# Patient Record
Sex: MALE | Race: WHITE | NOT HISPANIC OR LATINO | Employment: FULL TIME | URBAN - METROPOLITAN AREA
[De-identification: names, ages, dates, MRNs, and addresses within clinical notes are randomized per-mention and may not be internally consistent; named-entity substitution may affect disease eponyms.]

---

## 2022-03-08 ENCOUNTER — TREATMENT (OUTPATIENT)
Dept: PHYSICAL THERAPY | Facility: CLINIC | Age: 57
End: 2022-03-08

## 2022-03-08 DIAGNOSIS — S76.011D HIP STRAIN, RIGHT, SUBSEQUENT ENCOUNTER: Primary | ICD-10-CM

## 2022-03-08 DIAGNOSIS — S86.112D STRAIN OF GASTROCNEMIUS MUSCLE OF LEFT LOWER EXTREMITY, SUBSEQUENT ENCOUNTER: ICD-10-CM

## 2022-03-08 DIAGNOSIS — M25.559 PAIN, HIP: ICD-10-CM

## 2022-03-08 PROCEDURE — 97162 PT EVAL MOD COMPLEX 30 MIN: CPT | Performed by: PHYSICAL THERAPIST

## 2022-03-08 PROCEDURE — 97530 THERAPEUTIC ACTIVITIES: CPT | Performed by: PHYSICAL THERAPIST

## 2022-03-08 PROCEDURE — 97110 THERAPEUTIC EXERCISES: CPT | Performed by: PHYSICAL THERAPIST

## 2022-03-08 NOTE — PROGRESS NOTES
Physical Therapy Initial Evaluation and Plan of Care    Patient: Baron Ribeiro   : 1965  Diagnosis/ICD-10 Code:  Hip strain, right, subsequent encounter [S76.011D]  Referring practitioner: Federico Keys*  PMx Reviewed : 3/8/2022    Subjective Evaluation    History of Present Illness  Mechanism of injury: Pt presents to PT with (R) hip pain.  The pain is noticed with loading & torquing, rolling onto the (R) hip and initial weight bearing.    Initial injury happened after playing pickle ball for 2 hrs on concrete about 1 year ago.  The next day the pain started.        Saw Dr. Federico Vallejo, the orthopedic surgeon in [place].     Tore (L) Achilles 3 yrs ago - recovered w/o surgery but put in a boot and PT.  Feel weak in the (L) calf with too much exercise or tennis.        Occupation:   - [employer]  Activities:  Tennis, Weight Lifting, running , pickle ball  PLOF: Independent  Medical Hx Reviewed.      Quality of life: excellent    Pain  Current pain ratin  At best pain ratin  At worst pain ratin  Location: (R) Hip Region (over the greater troch)  Quality: dull ache, sharp and tight  Relieving factors: medications, rest and change in position  Aggravating factors: sleeping and stairs (initial WB after sitting > 1 hr, loading & torquing hip)  Progression: improved    Social Support  Lives in: multiple-level home  Lives with: spouse, young children and adult children    Hand dominance: right             Objective          Active Range of Motion   Left Hip   Flexion: 44 degrees   External rotation (90/90): 52 degrees   Internal rotation (90/90): 39 degrees     Right Hip   Flexion: 28 degrees   External rotation (90/90): 44 degrees   Internal rotation (90/90): 18 degrees with pain    Strength/Myotome Testing     Left Hip   Planes of Motion   Flexion: 5  Extension: 5  Abduction: 5  External rotation: 5  Internal rotation: 5    Right Hip   Planes of Motion   Flexion:  4  Extension: 4  Abduction: 4-  External rotation: 4-  Internal rotation: 4    Left Knee   Flexion: 5  Extension: 5    Right Knee   Flexion: 4+  Extension: 5    Left Ankle/Foot   Dorsiflexion: 5  Plantar flexion: 4+  Eversion: 5    Right Ankle/Foot   Dorsiflexion: 5  Plantar flexion: 5  Eversion: 5          Assessment & Plan     Assessment  Impairments: abnormal gait, abnormal or restricted ROM, activity intolerance, impaired balance, impaired physical strength, lacks appropriate home exercise program and pain with function    Assessment details: Pt presents to PT with symptoms consistent with a (R) hip strain likely caused by his (L) Gastroc weakness related to previous achilles tear (non surgical repair) and his return to playing tennis & pickle ball.  Pt would benefit from skilled PT intervention to address the deficits noted.   Prognosis: good    Goals  Plan Goals:  SHORT TERM GOALS: 5 weeks  1.  Patient to be compliant with HEP and demo good efficiency with TE  2.  Report < 2 sleep disturbances 2° hip pain.     3.  Increased Lumbar and SIJ mobility to allow for improved pelvic alignment and gait mechanics (equal step length and level pelvis throughout gait).    4.  Increased hip ER/IR ROM to WFL (IR to 30°) degrees to allow for increased ease with bed mobility and squatting.  5. Pt will report minimal-no tenderness to palpation with firm pressure to the (R) hip.   6. Pt. Able to ambulate after sitting > 60 mins with min with pain < 5/10 with acceptable pattern.      LONG TERM GOALS:  10 weeks  1.  Pt. to score > 90% perceived ability on LEFS  2.  Pain level < 2/10 in hips with all activities including sitting > 2 hr continuously.   3.  Hip  AROM to WNL to allow for return to ADL's/IADLS and functional activities without increased symptoms  4. Hip strength to 5/5  to allow for pushing, pulling and more strenuous activities to occur without pain.  Walk/run > 60 min. w/  no pain  5. No palpable tenderness to the  hip.         Plan  Therapy options: will be seen for skilled therapy services  Planned modality interventions: cryotherapy, electrical stimulation/Russian stimulation, iontophoresis, TENS, thermotherapy (hydrocollator packs), traction and ultrasound  Other planned modality interventions: Dry Needling  Planned therapy interventions: abdominal trunk stabilization, ADL retraining, body mechanics training, balance/weight-bearing training, flexibility, functional ROM exercises, gait training, home exercise program, joint mobilization, manual therapy, neuromuscular re-education, postural training, soft tissue mobilization, spinal/joint mobilization, strengthening, stretching and therapeutic activities  Frequency: 2x week  Duration in visits: 16  Treatment plan discussed with: patient        Manual Therapy:          mins  75652;  Therapeutic Exercise:     15     mins  37849;     Neuromuscular Cory:         mins  84538;    Therapeutic Activity:      10     mins  14842;     Gait Training:            mins  04614;     Ultrasound:           mins  78421;    Electrical Stimulation:          mins  96565 ( );  Dry Needling           mins self-pay  Traction           mins 98595  Canalith Repositioning         mins 37890      Timed Treatment:   25   mins   Total Treatment:     65   mins    PT SIGNATURE: Da Brewer PT   [facility] #[id number]    DATE TREATMENT INITIATED: 3/8/2022       Initial Certification  Certification Period: 6/6/2022  I certify that the therapy services are furnished while this patient is under my care.  The services outlined above are required by this patient, and will be reviewed every 90 days.     PHYSICIAN: Federico Vallejo MD      DATE:     Please sign and return via fax to [phone]. Thank you, [facility].

## 2022-03-14 ENCOUNTER — TREATMENT (OUTPATIENT)
Dept: PHYSICAL THERAPY | Facility: CLINIC | Age: 57
End: 2022-03-14

## 2022-03-14 DIAGNOSIS — S86.112D STRAIN OF GASTROCNEMIUS MUSCLE OF LEFT LOWER EXTREMITY, SUBSEQUENT ENCOUNTER: ICD-10-CM

## 2022-03-14 DIAGNOSIS — S76.011D HIP STRAIN, RIGHT, SUBSEQUENT ENCOUNTER: Primary | ICD-10-CM

## 2022-03-14 DIAGNOSIS — M25.559 PAIN, HIP: ICD-10-CM

## 2022-03-14 PROCEDURE — 97110 THERAPEUTIC EXERCISES: CPT | Performed by: PHYSICAL THERAPIST

## 2022-03-14 PROCEDURE — 97140 MANUAL THERAPY 1/> REGIONS: CPT | Performed by: PHYSICAL THERAPIST

## 2022-03-14 NOTE — PROGRESS NOTES
Physical Therapy Daily Progress Note  Visit: 2    Baron Ribeiro reports: I am doing ok.  I have not been doing my exercises because I traveled to [place] with my kids for work.      (Pt was late 7 mins)    Subjective     Objective   See Exercise, Manual, and Modality Logs for complete treatment.       Assessment & Plan     Assessment    Assessment details: Reviewed with the pt that he needs to do the HEP to see the benefit from PT.  The pt's (R) hip is very tight into IR.  The pt had a loud cavitation with the pubic clearing MET.  Minimal HEP progression because of noncompliance with exercises.  Pt needed review of all exercises for correct mechanics.      Plan  Plan details: Progress ROM / strengthening / stabilization / functional activity as tolerated          Manual Therapy:     10     mins  75482;  Therapeutic Exercise:      30    mins  44413;     Neuromuscular Cory:         mins  27489;    Therapeutic Activity:      5     mins  53520;     Gait Training:            mins  20767;     Ultrasound:           mins  12458;    Electrical Stimulation:          mins  53805 ( );  Dry Needling           mins self-pay  Traction           mins 58535  Canalith Repositioning         mins 85668      Timed Treatment:   45   mins   Total Treatment:     45   mins    Da Brewer PT  [place] License #: [id number]    Physical Therapist

## 2022-03-18 ENCOUNTER — TREATMENT (OUTPATIENT)
Dept: PHYSICAL THERAPY | Facility: CLINIC | Age: 57
End: 2022-03-18

## 2022-03-18 DIAGNOSIS — M25.559 PAIN, HIP: ICD-10-CM

## 2022-03-18 DIAGNOSIS — S76.011D HIP STRAIN, RIGHT, SUBSEQUENT ENCOUNTER: Primary | ICD-10-CM

## 2022-03-18 DIAGNOSIS — S86.112D STRAIN OF GASTROCNEMIUS MUSCLE OF LEFT LOWER EXTREMITY, SUBSEQUENT ENCOUNTER: ICD-10-CM

## 2022-03-18 PROCEDURE — 97110 THERAPEUTIC EXERCISES: CPT | Performed by: PHYSICAL THERAPIST

## 2022-03-18 PROCEDURE — 97140 MANUAL THERAPY 1/> REGIONS: CPT | Performed by: PHYSICAL THERAPIST

## 2022-03-18 NOTE — PROGRESS NOTES
Physical Therapy Daily Progress Note  Visit: 3    Baron Ribeiro reports: I was really sore in my (R) hip 2 days after my last visit.  I have tried to do my HEP more, but I have not done them today.      (Pt was 10 mins late for visit.)    Subjective     Objective   See Exercise, Manual, and Modality Logs for complete treatment.       Assessment & Plan     Assessment    Assessment details: The pt needed continued review of the HEP to perform correctly.  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  The pt's tardiness limited his visit progression.      Plan  Plan details: Look to start hip IR & ER exercises next visit.          Manual Therapy:     10     mins  26171;  Therapeutic Exercise:     33     mins  27341;     Neuromuscular Cory:         mins  38905;    Therapeutic Activity:      5     mins  55624;     Gait Training:            mins  36744;     Ultrasound:           mins  60926;    Electrical Stimulation:          mins  91860 ( );  Dry Needling           mins self-pay  Traction           mins 26018  Canalith Repositioning         mins 19909      Timed Treatment:   48   mins   Total Treatment:     48   mins    Da Brewer PT  [place] License #: [id number]    Physical Therapist

## 2022-03-21 ENCOUNTER — TREATMENT (OUTPATIENT)
Dept: PHYSICAL THERAPY | Facility: CLINIC | Age: 57
End: 2022-03-21

## 2022-03-21 DIAGNOSIS — S86.112D STRAIN OF GASTROCNEMIUS MUSCLE OF LEFT LOWER EXTREMITY, SUBSEQUENT ENCOUNTER: ICD-10-CM

## 2022-03-21 DIAGNOSIS — S76.011D HIP STRAIN, RIGHT, SUBSEQUENT ENCOUNTER: Primary | ICD-10-CM

## 2022-03-21 DIAGNOSIS — M25.559 PAIN, HIP: ICD-10-CM

## 2022-03-21 PROCEDURE — 97110 THERAPEUTIC EXERCISES: CPT | Performed by: PHYSICAL THERAPIST

## 2022-03-21 PROCEDURE — 97140 MANUAL THERAPY 1/> REGIONS: CPT | Performed by: PHYSICAL THERAPIST

## 2022-03-21 NOTE — PROGRESS NOTES
"Physical Therapy Daily Progress Note  Visit: 4    Baron Ribeiro reports: I did ok after my last visit.  I went to the gym after my last visit too and did a lot of lifting with my legs to work my hips.  I was fine the next day but I played tennis for 2 hours and I was sore while I played and very sore the next morning.   \"It feels like my  (R) hip is loose and banging around in there.\"    Subjective     Objective   See Exercise, Manual, and Modality Logs for complete treatment.       Assessment & Plan     Assessment    Assessment details: The pt had difficulty with (R) hip IR & ER rotation exercise, but did tolerate it.  Reviewed with the pt that he needs to be compliant with his HEP more and less intense wt lifting to help his recovery.      Plan  Plan details: Progress ROM / strengthening / stabilization / functional activity as tolerated    The pt won't return until next week because of travel for work.          Manual Therapy:     10     mins  83599;  Therapeutic Exercise:     38     mins  05729;     Neuromuscular Cory:         mins  09645;    Therapeutic Activity:      5     mins  37700;     Gait Training:            mins  68001;     Ultrasound:           mins  63639;    Electrical Stimulation:          mins  65623 ( );  Dry Needling           mins self-pay  Traction           mins 55091  Canalith Repositioning         mins 26153      Timed Treatment:   53   mins   Total Treatment:     53   mins    Da Brewer PT  [place] License #: [id number]    Physical Therapist      "

## 2022-03-28 ENCOUNTER — TREATMENT (OUTPATIENT)
Dept: PHYSICAL THERAPY | Facility: CLINIC | Age: 57
End: 2022-03-28

## 2022-03-28 DIAGNOSIS — S76.011D HIP STRAIN, RIGHT, SUBSEQUENT ENCOUNTER: Primary | ICD-10-CM

## 2022-03-28 DIAGNOSIS — M25.559 PAIN, HIP: ICD-10-CM

## 2022-03-28 DIAGNOSIS — S86.112D STRAIN OF GASTROCNEMIUS MUSCLE OF LEFT LOWER EXTREMITY, SUBSEQUENT ENCOUNTER: ICD-10-CM

## 2022-03-28 PROCEDURE — 97110 THERAPEUTIC EXERCISES: CPT | Performed by: PHYSICAL THERAPIST

## 2022-03-28 PROCEDURE — 97530 THERAPEUTIC ACTIVITIES: CPT | Performed by: PHYSICAL THERAPIST

## 2022-04-01 ENCOUNTER — TREATMENT (OUTPATIENT)
Dept: PHYSICAL THERAPY | Facility: CLINIC | Age: 57
End: 2022-04-01

## 2022-04-01 DIAGNOSIS — S86.112D STRAIN OF GASTROCNEMIUS MUSCLE OF LEFT LOWER EXTREMITY, SUBSEQUENT ENCOUNTER: ICD-10-CM

## 2022-04-01 DIAGNOSIS — S76.011D HIP STRAIN, RIGHT, SUBSEQUENT ENCOUNTER: Primary | ICD-10-CM

## 2022-04-01 DIAGNOSIS — M25.559 PAIN, HIP: ICD-10-CM

## 2022-04-01 PROCEDURE — 97110 THERAPEUTIC EXERCISES: CPT | Performed by: PHYSICAL THERAPIST

## 2022-04-01 PROCEDURE — 97530 THERAPEUTIC ACTIVITIES: CPT | Performed by: PHYSICAL THERAPIST

## 2022-04-01 NOTE — PROGRESS NOTES
Physical Therapy Daily Progress Note  Visit: 6    Baron Ribeiro reports: I am doing ok, still feel it in my (R) hip but I am feeling stronger.      Subjective     Objective   See Exercise, Manual, and Modality Logs for complete treatment.       Assessment & Plan     Assessment    Assessment details: Substantial progression of exercises today with good tolerance in the clinic.  Reviewed what to do if has symptoms of soreness.  Was able to do more because the pt was on time to visit.      Plan  Plan details: Progress ROM / strengthening / stabilization / functional activity as tolerated          Manual Therapy:          mins  05479;  Therapeutic Exercise:     50     mins  48841;     Neuromuscular Cory:     5    mins  12161;    Therapeutic Activity:      10     mins  00626;     Gait Training:            mins  87439;     Ultrasound:           mins  99228;    Electrical Stimulation:          mins  49200 ( );  Dry Needling           mins self-pay  Traction           mins 57559  Canalith Repositioning         mins 06219      Timed Treatment:   65   mins   Total Treatment:     65   mins    Da Brewer PT  [place] License #: [id number]    Physical Therapist

## 2022-04-07 ENCOUNTER — TREATMENT (OUTPATIENT)
Dept: PHYSICAL THERAPY | Facility: CLINIC | Age: 57
End: 2022-04-07

## 2022-04-07 DIAGNOSIS — M25.559 PAIN, HIP: ICD-10-CM

## 2022-04-07 DIAGNOSIS — S76.011D HIP STRAIN, RIGHT, SUBSEQUENT ENCOUNTER: Primary | ICD-10-CM

## 2022-04-07 DIAGNOSIS — S86.112D STRAIN OF GASTROCNEMIUS MUSCLE OF LEFT LOWER EXTREMITY, SUBSEQUENT ENCOUNTER: ICD-10-CM

## 2022-04-07 PROCEDURE — 97530 THERAPEUTIC ACTIVITIES: CPT | Performed by: PHYSICAL THERAPIST

## 2022-04-07 PROCEDURE — 97110 THERAPEUTIC EXERCISES: CPT | Performed by: PHYSICAL THERAPIST

## 2022-04-07 NOTE — PROGRESS NOTES
Physical Therapy Daily Progress Note  Visit: 7    Baron Ribeiro reports: My (R) hip is feeling a little better.  It is not as sore when I get out of the car or bed.  Usually it is painful after sitting or laying down for awhile.    Subjective     Objective   See Exercise, Manual, and Modality Logs for complete treatment.       Assessment & Plan     Assessment    Assessment details: The pt is demonstrating improvement with increased strength and less pain in the (R) hip.  Continuing to work on strength and endurance of the (R) hip with rotators.  He returns to tennis this weekend so it will be a good test at a high level.      Plan  Plan details: Progress ROM / strengthening / stabilization / functional activity as tolerated          Manual Therapy:          mins  76271;  Therapeutic Exercise:     50     mins  40440;     Neuromuscular Cory:     5    mins  77331;    Therapeutic Activity:      10     mins  60121;     Gait Training:            mins  98458;     Ultrasound:           mins  62388;    Electrical Stimulation:          mins  46838 ( );  Dry Needling           mins self-pay  Traction           mins 99786  Canalith Repositioning         mins 80850      Timed Treatment:   65   mins   Total Treatment:     65   mins    Da Brewer PT  [place] License #: [id number]    Physical Therapist

## 2022-04-18 ENCOUNTER — TREATMENT (OUTPATIENT)
Dept: PHYSICAL THERAPY | Facility: CLINIC | Age: 57
End: 2022-04-18

## 2022-04-18 DIAGNOSIS — M25.559 PAIN, HIP: ICD-10-CM

## 2022-04-18 DIAGNOSIS — S76.011D HIP STRAIN, RIGHT, SUBSEQUENT ENCOUNTER: Primary | ICD-10-CM

## 2022-04-18 DIAGNOSIS — S86.112D STRAIN OF GASTROCNEMIUS MUSCLE OF LEFT LOWER EXTREMITY, SUBSEQUENT ENCOUNTER: ICD-10-CM

## 2022-04-18 PROCEDURE — 97110 THERAPEUTIC EXERCISES: CPT | Performed by: PHYSICAL THERAPIST

## 2022-04-18 NOTE — PROGRESS NOTES
Physical Therapy Daily Treatment Note/30 day re-assess      Patient: Baron Ribeiro   : 1965  Referring practitioner: Federico Keys*  Date of Initial Visit: Type: THERAPY  Noted: 3/8/2022  Today's Date: 2022  Patient seen for 8 sessions       Visit Diagnoses:    ICD-10-CM ICD-9-CM   1. Hip strain, right, subsequent encounter  S76.011D V58.89     843.9   2. Pain, hip  M25.559 719.45   3. Strain of gastrocnemius muscle of left lower extremity, subsequent encounter  S86.112D V58.89     844.8       Subjective Evaluation    History of Present Illness    Subjective comment: getting better. admits not walking enough but is biking more and doing his exercises more       Objective   See Exercise, Manual, and Modality Logs for complete treatment.       Assessment & Plan     Assessment    Assessment details: Goals  Plan Goals:  SHORT TERM GOALS: 5 weeks  1.  Patient to be compliant with HEP and demo good efficiency with TE. Partially met. Not doing HEP as much as he could.   2.  Report < 2 sleep disturbances 2° hip pain.   MET  3.  Increased Lumbar and SIJ mobility to allow for improved pelvic alignment and gait mechanics (equal step length and level pelvis throughout gait).  Ongoing. Still lacking end range mobility  4.  Increased hip ER/IR ROM to WFL (IR to 30°) degrees to allow for increased ease with bed mobility and squatting. Lacking normal ER on the R  5. Pt will report minimal-no tenderness to palpation with firm pressure to the (R) hip. MET  6. Pt. Able to ambulate after sitting > 60 mins with min with pain < 5/10 with acceptable pattern.  MET. Still can be sore but intense pain has subsided.     LONG TERM GOALS:  10 weeks  1.  Pt. to score > 90% perceived ability on LEFS. NOT MET. Score actually lower due to more clear numbers on running.   2.  Pain level < 2/10 in hips with all activities including sitting > 2 hr continuously. Partially MET  3.  Hip  AROM to WNL to allow for return to  ADL's/IADLS and functional activities without increased symptoms. Partially met. Hip ext and abd limited due to joint and muscular restrictions.   4. Hip strength to 5/5  to allow for pushing, pulling and more strenuous activities to occur without pain.  Walk/run > 60 min. w/  no pain. Partially met. 4+  5. No palpable tenderness to the hip.  Partially met. Mild strain glut med/piri    Pt seen for his 8th session today with fair to good overall improvement in condition. Feel that pt could make further improvements with more short walks and stretches during the day. Significant quad and hip flexor and ITB tightness continues. Unable to find R hip image from his [place] doctor in [facility]. Admits he needs an ortho in [place] as his ortho has retired.   P: needs a few more sessions of skilled PT to help pt work on return to sports progression and make sure he is mixing up a program of strength and flexibility. Focus the next 2 weeks will be on more frequent active stretching during the day.          Timed:         Manual Therapy:         mins  88492;     Therapeutic Exercise:    45     mins  57693;     Neuromuscular Cory:        mins  51073;    Therapeutic Activity:          mins  26271;     Gait Training:           mins  43780;     Ultrasound:          mins  17635;    Ionto                                   mins   49756  Self Care                            mins   80892  Canalith Repos         mins 12972      Un-Timed:  Electrical Stimulation:         mins  31425 ( );  Dry Needling          mins self-pay  Traction          mins 52589      Timed Treatment:   45   mins   Total Treatment:     45   mins    Zorre Zeno Kimura, PT  [place] License: [id number]    [place] License:  [id number]

## 2022-06-20 ENCOUNTER — TELEPHONE (OUTPATIENT)
Dept: PHYSICAL THERAPY | Facility: CLINIC | Age: 57
End: 2022-06-20